# Patient Record
Sex: FEMALE | Race: BLACK OR AFRICAN AMERICAN | NOT HISPANIC OR LATINO | Employment: OTHER | URBAN - METROPOLITAN AREA
[De-identification: names, ages, dates, MRNs, and addresses within clinical notes are randomized per-mention and may not be internally consistent; named-entity substitution may affect disease eponyms.]

---

## 2020-10-15 ENCOUNTER — HISTORICAL (OUTPATIENT)
Dept: ADMINISTRATIVE | Facility: HOSPITAL | Age: 65
End: 2020-10-15

## 2020-12-10 ENCOUNTER — HISTORICAL (OUTPATIENT)
Dept: ADMINISTRATIVE | Facility: HOSPITAL | Age: 65
End: 2020-12-10

## 2021-01-25 ENCOUNTER — HISTORICAL (OUTPATIENT)
Dept: PREADMISSION TESTING | Facility: HOSPITAL | Age: 66
End: 2021-01-25

## 2022-04-07 ENCOUNTER — HISTORICAL (OUTPATIENT)
Dept: ADMINISTRATIVE | Facility: HOSPITAL | Age: 67
End: 2022-04-07

## 2022-04-24 VITALS
BODY MASS INDEX: 25.74 KG/M2 | DIASTOLIC BLOOD PRESSURE: 79 MMHG | WEIGHT: 164 LBS | HEIGHT: 67 IN | SYSTOLIC BLOOD PRESSURE: 120 MMHG

## 2022-05-02 NOTE — HISTORICAL [facility] C[facility]ER
This is a historical note converted from [employer]. Formatting and pictures may have been removed.  Please reference [employer] for original formatting and attached multimedia. Procedure Name  Bilateral L3-4 and Left L4-5?Facet Injections  ?   Pre-Procedure Diagnoses:  1. Chronic pain syndrome  2. Low back pain  3. Lumbar facet arthropathy  ?   Post-Procedure Diagnoses:  1. Chronic pain syndrome  2. Low back pain  3. Lumbar facet arthropathy  ?   Anesthesia:  Local and MAC  ?  Estimated Blood Loss:  None  ?  Complications:  None  ?  Informed Consent:  The procedure, risks, benefits, and alternatives were discussed with the patient.? There were no contraindications to the procedure.? The patient expressed understanding and agreed to proceed.? Fully informed written consent was obtained.?  ?  Description of the Procedure:  The patient was taken to the operating room.? IV access was obtained prior to the start of the procedure.? The patient was positioned prone on the fluoroscopy table.? Continuous hemodynamic monitoring was initiated and continued throughout the duration of the procedure.? The skin overlying the lumbosacral spine was prepped with Chloraprep and draped into a sterile field.? Fluoroscopy was used to identify the location of the left L4-5 facet joint.?Skin anesthesia was achieved using?0.5 mL of 1% lidocaine over each injection site.? A 22-gauge 3.5-inch Quinke spinal needle was slowly inserted and advanced under intermittent fluoroscopic guidance until it entered the joint capsule.? Negative aspiration for blood or CSF was confirmed.? Then a combination of?10 mg of Kenalog and?0.5 mL of 0.5% bupivacaine was easily injected at each level.? There was no pain on injection.? The needles were removed intact and bleeding was nil.? The same procedure was repeated in identical fashion on the?left side at L3-4 and on the right side at L3-4. Sterile bandages were applied.? The patient was taken to the recovery room for  further observation in stable condition.? The patient was then discharged home without any complications.

## 2022-05-02 NOTE — HISTORICAL [facility]
This is a historical note converted from [employer]. Formatting and pictures may have been removed.  Please reference [facility] for original formatting and attached multimedia. Procedure Name  Bilateral L3 Transforaminal Epidural Steroid Injections  ?   Pre-Procedure Diagnoses:  1. Chronic pain syndrome  2. Low back pain  3. Lumbar radiculopathy  4. Lumbar disc displacement  5. Lumbar degenerative disc disease  ?   Post-Procedure Diagnoses:  1. Chronic pain syndrome  2. Low back pain  3. Lumbar radiculopathy  4. Lumbar disc displacement  5. Lumbar degenerative disc disease  ?   Anesthesia:  Local and MAC  ?  Estimated Blood Loss:  None  ?  Complications:  None  ?  Informed Consent:  The procedure, risks, benefits, and alternatives were discussed with the patient.? There were no contraindications to the procedure.? The patient expressed understanding and agreed to proceed.? Fully informed written consent was obtained.?  ?  Description of the Procedure:  The patient was taken to the operating room.? IV access was obtained prior to the start of the procedure.? The patient was positioned prone on the fluoroscopy table.? Continuous hemodynamic monitoring was initiated and continued throughout the duration of the procedure.? The skin overlying the lumbosacral spine was prepped with Chloraprep and draped into a sterile field.? An oblique fluoroscopic view was obtained on the left side at L3, with the superior articular process of the inferior vertebral body aligned with the pedicle.? Skin anesthesia was achieved using 1 mL of 1% lidocaine.? A 22-gauge 3.5 inch Quinke spinal needle was slowly inserted and advanced towards the 6 oclock position of the pedicle and into the epidural space.? Proper needle position was confirmed under AP, oblique, and lateral fluoroscopic views.? Negative aspiration for blood or CSF was confirmed.? 0.5 mL of Isovue contrast was injected.? Fluoroscopic imaging revealed a clear outline of the spinal  nerve with proximal spread of agent through the neural foramen and into the epidural space.? Then a combination of 5 mg of dexamethasone and 1 mL of 0.5% bupivacaine was easily injected.? There was no pain on injection.? The needle was removed intact and bleeding was nil.? The same procedure was repeated in identical fashion on the right side at L3. Sterile bandages were applied.? The patient was taken to the recovery room for further observation in stable condition.? The patient was then discharged home without any complications.